# Patient Record
Sex: MALE | Race: WHITE | Employment: PART TIME | URBAN - METROPOLITAN AREA
[De-identification: names, ages, dates, MRNs, and addresses within clinical notes are randomized per-mention and may not be internally consistent; named-entity substitution may affect disease eponyms.]

---

## 2017-02-10 DIAGNOSIS — Z31.41 FERTILITY TESTING: ICD-10-CM

## 2017-02-10 PROCEDURE — 89322 SEMEN ANAL STRICT CRITERIA: CPT

## 2017-02-13 LAB
ABNORMAL SPERM: 99 MORPHOLOGY
ABSTINENCE DAYS: 4 DAYS (ref 2–7)
AGGLUTINATION: ABNORMAL YES/NO
ANALYSIS TEMP - CENTIGRADE: 24 CENTIGRADE
CELL FRAGMENTS: ABNORMAL %
COLLECTION METHOD: ABNORMAL
COLLECTION SITE: ABNORMAL
CONSENT TO RELEASE TO PARTNER: YES
HEAD DEFECT: 98
IMMATURE SPERM: ABNORMAL %
IMMOTILE: 31 %
LAB RECEIPT TIME: ABNORMAL
LIQUEFIED: ABNORMAL YES/NO
MIDPIECE DEFECT: 22
NON-PROGRESSIVE MOTILITY: 7 %
NORMAL SPERM: 1 % NORMAL FORMS (ref 4–?)
PROGRESSIVE MOTILITY: 62 % (ref 32–?)
ROUND CELLS: 0.4 MILLION/ML (ref ?–2)
SPECIMEN CONCENTRATION: 52 MILLION/ML (ref 15–?)
SPECIMEN PH: 7.6 PH (ref 7.2–?)
SPECIMEN TYPE: ABNORMAL
SPECIMEN VOL UR: 3 ML (ref 1.5–?)
TAIL DEFECT: 8
TIME OF ANALYSIS: ABNORMAL
TOTAL NUMBER: 156 MILLION (ref 39–?)
TOTAL PROGRESSIVE MOTILE: 97 MILLION (ref 15.6–?)
VISCOUS: ABNORMAL YES/NO
VITALITY: ABNORMAL % (ref 58–?)
WBC SPECIMEN: ABNORMAL %

## 2017-02-20 ENCOUNTER — PRE VISIT (OUTPATIENT)
Dept: UROLOGY | Facility: CLINIC | Age: 37
End: 2017-02-20

## 2017-02-20 NOTE — TELEPHONE ENCOUNTER
Patient coming in to discuss semen analysis that was completed on 2/1017. Records in [facility]. Patient last saw Dr Mcrae on 10/26/15. No need to call patient

## 2017-02-23 ENCOUNTER — OFFICE VISIT (OUTPATIENT)
Dept: UROLOGY | Facility: CLINIC | Age: 37
End: 2017-02-23

## 2017-02-23 VITALS
SYSTOLIC BLOOD PRESSURE: 142 MMHG | HEART RATE: 86 BPM | HEIGHT: 72 IN | DIASTOLIC BLOOD PRESSURE: 87 MMHG | BODY MASS INDEX: 31.97 KG/M2 | WEIGHT: 236 LBS

## 2017-02-23 DIAGNOSIS — R86.8 TERATOSPERMIA: Primary | ICD-10-CM

## 2017-02-23 LAB
FSH SERPL-ACNC: 5.3 IU/L (ref 0.7–10.8)
HGB BLD-MCNC: 15.3 G/DL (ref 13.3–17.7)

## 2017-02-23 ASSESSMENT — PAIN SCALES - GENERAL: PAINLEVEL: NO PAIN (0)

## 2017-02-23 NOTE — LETTER
2/23/2017       RE: Tico Larry  [id number]  [facility] [address]     Dear Colleague,    Thank you for referring your patient, Tico Larry, to the [facility] UROLOGY AND INST FOR PROSTATE AND UROLOGIC CANCERS at [facility]. Please see a copy of my visit note below.    Fertility follow-up for Tico Larry.      His spouse is Mikayla age 35 (8/9/1981).     This couple has been attempting to conceive for the last4 years. They have 2 previous pregnancies (miscarriages) together, last was about 2 years ago.  Pregnancies with other partners: He has zero, she has 1 (elective termination). They have tried timed intercourse. They have tried IUI (6 cycles, 2014), not IVF.  They've done one more IUI since I saw him last.     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  He has met with a genetics counselor in the past.      Female factors suspected: she has had one previous pregnancy ( elective termination). Regular cycles. Previous HSG, US, hormone evaluations normal.    I discussed with them that IVF with PGD would be advised as the most effective route to a pregnancy.    He started on Clomid 1/2016.   2/2016 testosterone lab was not improve at all.  Dose increased to 25mg QD.  Total progressive motile count after 3-4 of Clomid was >70 M. May 2016    He was supposed to get blood labs retested last May/June 2016 but didn't come in until now.    Total progressive motile count has increased from 30.7M prior to medication to >90 million on Clomid 25mg QD. 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     Discussed teratospermia- given fairly ubiquitous finding among high-quality diagnostic fertility labs, this makes the clinical  utility questionable.    We'll recheck blood labs today.  He's been off the medication a week due to a vacation, however.    His spouse is Mikayla age 34 (8/9/1981).     This couple has been attempting to conceive for the last 3 years. They have 2 previous pregnancies (miscarriages) together. Pregnancies with other partners: He has zero, she has 1 (elective termination). They have tried timed intercourse. They have tried IUI (6 cycles, 2014), not IVF. 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 Regular cycles. Previous HSG, US, hormone evaluations normal.    Component      Latest Ref Rng & Units 10/26/2015 2/24/2016 2/10/2017   Collection Method         Masturbation   Collection Site         EVONNE   Specimen Type         Semen   Lab Receipt Time         7:12 AM   Time of Analysis         7:32 AM   Analysis Temp - Centigrade      centigrade   24   Abstinence days      2 - 7 days   4   Liquefied      yes/no   Y   Viscous      yes/no   N   Agglutination      yes/no   N   pH      7.2 pH   7.6   Volume      1.5 ml   3.0   Concentration      15 million/ml   52   Total Number      39 million   156   Progressive motility      32 %   62   Non-progressive motility      %   7   Immotile      %   31   Total Progressive Motile      15.6 million   97   Vitality      58 %   NA   Normal Sperm      4 % normal forms   1 (A)   Abnormal Sperm      morphology   99   Head Defect         98   Midpiece Defect         22   Tail Defect         8   Round Cells      2 million/ml   0.4   WBC      %   NA   Immature Sperm      %   NA   Cell Fragments      %   NA   Consent to Release to Partner         YES   Testosterone Total      240 - 950 ng/dL 306 289    Sex Hormone Binding Globulin      11 - 80 nmol/L 18     Free Testosterone Calculated      4.7 - 24.4 ng/dL 8.16      FSH      0.7 - 10.8 IU/L 7.0 6.3    Estradiol Ultrasensitive      10 - 40 pg/mL 18     Hematocrit      40.0 - 53.0 %  43.1      A-  Teratospermia  Balanced chromosomal translocation.    Plan  -recheck hormone labs today.  - discussed that from the male standpoint can restart Clomid, there is not much else I can offer. We'll see the hormone labs first.  - from the female standpoint, I advised IVF with PGD.  - return to clinic 1 year, sooner if needed.      CC: Duane    20 min visit, over 50% face to face in counseling/discussion of above issues.    Again, thank you for allowing me to participate in the care of your patient.      Sincerely,    Oj Mcrae MD

## 2017-02-23 NOTE — MR AVS SNAPSHOT
After Visit Summary   2/23/2017    Tico Larry    MRN: [id number]           Patient Information     Date Of Birth          1980        Visit Information        Provider Department      2/23/2017 7:00 AM Oj Mcrae MD [facility] Urology and [facility] for Prostate and Urologic Cancers        Today's Diagnoses     Teratospermia    -  1      Care Instructions    Complete lab work and Dr Mcrae can contact you with results via my-chart     It was a pleasure meeting with you today.  Thank you for allowing me and my team the privilege of caring for you today.  YOU are the reason we are here, and I truly hope we provided you with the excellent service you deserve.  Please let us know if there is anything else we can do for you so that we can be sure you are leaving completely satisfied with your care experience.                Follow-ups after your visit        Follow-up notes from your care team     Return in about 1 year (around 2/23/2018).      Your next 10 appointments already scheduled     Feb 23, 2017  7:45 AM CST   LAB with [facility] ([facility])    [address] [place] [id number] [phone]           Patient must bring picture ID.  Patient should be prepared to give a urine specimen  Please do not eat 10-12 hours before your appointment if you are coming in fasting for labs on lipids, cholesterol, or glucose (sugar).  Pregnant women should follow their Care Team instructions. Water with medications is okay. Do not drink coffee or other fluids.   If you have concerns about taking  your medications, please ask at office or if scheduling via Intercom, send a message by clicking on Secure Messaging, Message Your Care Team.              Who to contact     Please call your clinic at [phone] to:    Ask questions about your health    Make or cancel appointments    Discuss your medicines    Learn about your test results    Speak  to your doctor   If you have compliments or concerns about an experience at your clinic, or if you wish to file a complaint, please contact [facility] Patient Relations at [phone] or email us at [email or web address]         Additional Information About Your Visit        MyChart Information     [employer] gives you secure access to your electronic health record. If you see a primary care provider, you can also send messages to your care team and make appointments. If you have questions, please call your primary care clinic.  If you do not have a primary care provider, please call [phone] and they will assist you.      The [employer] is an electronic gateway that provides easy, online access to your medical records. With [employer], you can request a clinic appointment, read your test results, renew a prescription or communicate with your care team.     To access your existing account, please contact your [facility] or call [phone] for assistance.        Care EveryWhere ID     This is your Care EveryWhere ID. This could be used by other organizations to access your [place] medical records  [id number]        Your Vitals Were     Pulse Height BMI (Body Mass Index)             86 1.829 m (6') 32.01 kg/m2          Blood Pressure from Last 3 Encounters:   02/23/17 142/87   03/11/16 132/86   10/26/15 139/89    Weight from Last 3 Encounters:   02/23/17 107 kg (236 lb)   03/11/16 107.5 kg (236 lb 14.4 oz)   10/26/15 107.5 kg (237 lb 1.6 oz)              We Performed the Following     Follicle stimulating hormone     Hemoglobin     Testosterone total        Primary Care Provider    None Specified       No primary provider on file.        Thank you!     Thank you for choosing [facility] UROLOGY AND [facility] FOR PROSTATE AND UROLOGIC CANCERS  for your care. Our goal is always to provide you with excellent care. Hearing back from our patients is one way we can  continue to improve our services. Please take a few minutes to complete the written survey that you may receive in the mail after your visit with us. Thank you!             Your Updated Medication List - Protect others around you: Learn how to safely use, store and throw away your medicines at www.disposemymeds.org.          This list is accurate as of: 2/23/17  7:43 AM.  Always use your most recent med list.                   Brand Name Dispense Instructions for use    clomiPHENE 50 MG tablet    CLOMID    30 tablet    Take one half tablet (25 mg) po daily       mupirocin 2 % cream    BACTROBAN    15 g    Apply topically 3 times daily

## 2017-02-23 NOTE — PATIENT INSTRUCTIONS
Complete lab work and Dr Mcrae can contact you with results via my-chart     It was a pleasure meeting with you today.  Thank you for allowing me and my team the privilege of caring for you today.  YOU are the reason we are here, and I truly hope we provided you with the excellent service you deserve.  Please let us know if there is anything else we can do for you so that we can be sure you are leaving completely satisfied with your care experience.

## 2017-02-23 NOTE — PROGRESS NOTES
Fertility follow-up for Tico Larry.      His spouse is Mikayla age 35 (8/9/1981).     This couple has been attempting to conceive for the last4 years. They have 2 previous pregnancies (miscarriages) together, last was about 2 years ago.  Pregnancies with other partners: He has zero, she has 1 (elective termination). They have tried timed intercourse. They have tried IUI (6 cycles, 2014), not IVF.  They've done one more IUI since I saw him last.     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  He has met with a genetics counselor in the past.      Female factors suspected: she has had one previous pregnancy ( elective termination). Regular cycles. Previous HSG, US, hormone evaluations normal.    I discussed with them that IVF with PGD would be advised as the most effective route to a pregnancy.    He started on Clomid 1/2016.   2/2016 testosterone lab was not improve at all.  Dose increased to 25mg QD.  Total progressive motile count after 3-4 of Clomid was >70 M. May 2016    He was supposed to get blood labs retested last May/June 2016 but didn't come in until now.    Total progressive motile count has increased from 30.7M prior to medication to >90 million on Clomid 25mg QD. 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     Discussed teratospermia- given fairly ubiquitous finding among high-quality diagnostic fertility labs, this makes the clinical utility questionable.    We'll recheck blood labs today.  He's been off the medication a week due to a vacation, however.    His spouse is Mikayla age 34 (8/9/1981).     This couple has been attempting to conceive for the last 3 years. They have 2 previous pregnancies (miscarriages) together. Pregnancies with other  partners: He has zero, she has 1 (elective termination). They have tried timed intercourse. They have tried IUI (6 cycles, 2014), not IVF. 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 Regular cycles. Previous HSG, US, hormone evaluations normal.    Component      Latest Ref Rng & Units 10/26/2015 2/24/2016 2/10/2017   Collection Method         Masturbation   Collection Site         EVONNE   Specimen Type         Semen   Lab Receipt Time         7:12 AM   Time of Analysis         7:32 AM   Analysis Temp - Centigrade      centigrade   24   Abstinence days      2 - 7 days   4   Liquefied      yes/no   Y   Viscous      yes/no   N   Agglutination      yes/no   N   pH      7.2 pH   7.6   Volume      1.5 ml   3.0   Concentration      15 million/ml   52   Total Number      39 million   156   Progressive motility      32 %   62   Non-progressive motility      %   7   Immotile      %   31   Total Progressive Motile      15.6 million   97   Vitality      58 %   NA   Normal Sperm      4 % normal forms   1 (A)   Abnormal Sperm      morphology   99   Head Defect         98   Midpiece Defect         22   Tail Defect         8   Round Cells      2 million/ml   0.4   WBC      %   NA   Immature Sperm      %   NA   Cell Fragments      %   NA   Consent to Release to Partner         YES   Testosterone Total      240 - 950 ng/dL 306 289    Sex Hormone Binding Globulin      11 - 80 nmol/L 18     Free Testosterone Calculated      4.7 - 24.4 ng/dL 8.16     FSH      0.7 - 10.8 IU/L 7.0 6.3    Estradiol Ultrasensitive      10 - 40 pg/mL 18     Hematocrit      40.0 - 53.0 %  43.1      A-  Teratospermia  Balanced chromosomal translocation.    Plan  -recheck hormone labs today.  - discussed that from the male standpoint can restart Clomid, there is not much else I can  offer. We'll see the hormone labs first.  - from the female standpoint, I advised IVF with PGD.  - return to clinic 1 year, sooner if needed.      CC: Duane    20 min visit, over 50% face to face in counseling/discussion of above issues.

## 2017-02-24 LAB — TESTOST SERPL-MCNC: 406 NG/DL (ref 240–950)

## 2017-03-02 NOTE — PROGRESS NOTES
Please notify pt of these results.    Fertility blood work at baseline looks normal, better than a year ago.  Clomid would be an option for him, if he would like to try it. Follow-up would be per Clomid protocol, then.    - thanks.  LYNETTE

## 2017-03-07 DIAGNOSIS — Z31.41 FERTILITY TESTING: Primary | ICD-10-CM

## 2017-03-09 DIAGNOSIS — E29.1 TESTICULAR FAILURE: ICD-10-CM

## 2017-03-09 RX ORDER — CLOMIPHENE CITRATE 50 MG/1
TABLET ORAL
Qty: 30 TABLET | Refills: 6 | Status: SHIPPED | OUTPATIENT
Start: 2017-03-09

## 2017-03-09 NOTE — TELEPHONE ENCOUNTER
Last visit 3/2/17   Clomid 25 mg daily  Sent refill to [facility] in [place]    Krysta Garcia, RN   Care Coordinator Urology

## 2018-06-22 ENCOUNTER — TELEPHONE (OUTPATIENT)
Dept: FAMILY MEDICINE | Facility: OTHER | Age: 38
End: 2018-06-22

## 2019-09-29 ENCOUNTER — HEALTH MAINTENANCE LETTER (OUTPATIENT)
Age: 39
End: 2019-09-29

## 2021-01-14 ENCOUNTER — HEALTH MAINTENANCE LETTER (OUTPATIENT)
Age: 41
End: 2021-01-14

## 2021-10-24 ENCOUNTER — HEALTH MAINTENANCE LETTER (OUTPATIENT)
Age: 41
End: 2021-10-24

## 2022-02-12 ENCOUNTER — HEALTH MAINTENANCE LETTER (OUTPATIENT)
Age: 42
End: 2022-02-12

## 2022-10-10 ENCOUNTER — HEALTH MAINTENANCE LETTER (OUTPATIENT)
Age: 42
End: 2022-10-10

## 2023-03-25 ENCOUNTER — HEALTH MAINTENANCE LETTER (OUTPATIENT)
Age: 43
End: 2023-03-25